# Patient Record
Sex: FEMALE | Race: BLACK OR AFRICAN AMERICAN | Employment: FULL TIME | URBAN - METROPOLITAN AREA
[De-identification: names, ages, dates, MRNs, and addresses within clinical notes are randomized per-mention and may not be internally consistent; named-entity substitution may affect disease eponyms.]

---

## 2019-10-07 ENCOUNTER — OFFICE VISIT (OUTPATIENT)
Dept: FAMILY MEDICINE CLINIC | Age: 44
End: 2019-10-07

## 2019-10-07 VITALS
HEIGHT: 63 IN | TEMPERATURE: 97.9 F | HEART RATE: 83 BPM | RESPIRATION RATE: 16 BRPM | BODY MASS INDEX: 35.44 KG/M2 | WEIGHT: 200 LBS | DIASTOLIC BLOOD PRESSURE: 78 MMHG | SYSTOLIC BLOOD PRESSURE: 114 MMHG | OXYGEN SATURATION: 99 %

## 2019-10-07 DIAGNOSIS — I10 ESSENTIAL HYPERTENSION: Primary | ICD-10-CM

## 2019-10-07 DIAGNOSIS — Z83.3 FAMILY HISTORY OF DIABETES MELLITUS: ICD-10-CM

## 2019-10-07 DIAGNOSIS — A60.00 GENITAL HERPES SIMPLEX, UNSPECIFIED SITE: ICD-10-CM

## 2019-10-07 DIAGNOSIS — Z11.3 ROUTINE SCREENING FOR STI (SEXUALLY TRANSMITTED INFECTION): ICD-10-CM

## 2019-10-07 RX ORDER — HYDROCHLOROTHIAZIDE 25 MG/1
TABLET ORAL
Refills: 5 | COMMUNITY
Start: 2019-10-03 | End: 2019-10-07 | Stop reason: SDUPTHER

## 2019-10-07 RX ORDER — HYDROCHLOROTHIAZIDE 25 MG/1
25 TABLET ORAL DAILY
Qty: 90 TAB | Refills: 1 | Status: SHIPPED | OUTPATIENT
Start: 2019-10-07 | End: 2020-08-28 | Stop reason: SDUPTHER

## 2019-10-07 RX ORDER — VALACYCLOVIR HYDROCHLORIDE 500 MG/1
500 TABLET, FILM COATED ORAL 2 TIMES DAILY
Qty: 10 TAB | Refills: 5 | Status: SHIPPED | OUTPATIENT
Start: 2019-10-07 | End: 2020-08-28 | Stop reason: SDUPTHER

## 2019-10-07 RX ORDER — VALACYCLOVIR HYDROCHLORIDE 500 MG/1
TABLET, FILM COATED ORAL
Refills: 5 | COMMUNITY
Start: 2019-09-04 | End: 2019-10-07 | Stop reason: SDUPTHER

## 2019-10-07 NOTE — PROGRESS NOTES
Identified pt with two pt identifiers(name and ). Reviewed record in preparation for visit and have obtained necessary documentation. Chief Complaint   Patient presents with   [address] [facility] Due   Topic    DTaP/Tdap/Td series (1 - Tdap)    PAP AKA CERVICAL CYTOLOGY     Influenza Age 5 to Adult        Coordination of Care Questionnaire:  :   1) Have you been to an emergency room, urgent care, or hospitalized since your last visit? If yes, where when, and reason for visit? no      2. Have seen or consulted any other health care provider since your last visit? If yes, where when, and reason for visit?  no        Patient is accompanied by self I have received verbal consent from Blas Good to discuss any/all medical information while they are present in the room.

## 2019-10-07 NOTE — PATIENT INSTRUCTIONS
DASH Diet: Care Instructions  Your Care Instructions    The DASH diet is an eating plan that can help lower your blood pressure. DASH stands for Dietary Approaches to Stop Hypertension. Hypertension is high blood pressure. The DASH diet focuses on eating foods that are high in calcium, potassium, and magnesium. These nutrients can lower blood pressure. The foods that are highest in these nutrients are fruits, vegetables, low-fat dairy products, nuts, seeds, and legumes. But taking calcium, potassium, and magnesium supplements instead of eating foods that are high in those nutrients does not have the same effect. The DASH diet also includes whole grains, fish, and poultry. The DASH diet is one of several lifestyle changes your doctor may recommend to lower your high blood pressure. Your doctor may also want you to decrease the amount of sodium in your diet. Lowering sodium while following the DASH diet can lower blood pressure even further than just the DASH diet alone. Follow-up care is a key part of your treatment and safety. Be sure to make and go to all appointments, and call your doctor if you are having problems. It's also a good idea to know your test results and keep a list of the medicines you take. How can you care for yourself at home? Following the DASH diet  · Eat 4 to 5 servings of fruit each day. A serving is 1 medium-sized piece of fruit, ½ cup chopped or canned fruit, 1/4 cup dried fruit, or 4 ounces (½ cup) of fruit juice. Choose fruit more often than fruit juice. · Eat 4 to 5 servings of vegetables each day. A serving is 1 cup of lettuce or raw leafy vegetables, ½ cup of chopped or cooked vegetables, or 4 ounces (½ cup) of vegetable juice. Choose vegetables more often than vegetable juice. · Get 2 to 3 servings of low-fat and fat-free dairy each day. A serving is 8 ounces of milk, 1 cup of yogurt, or 1 ½ ounces of cheese. · Eat 6 to 8 servings of grains each day.  A serving is 1 slice of bread, 1 ounce of dry cereal, or ½ cup of cooked rice, pasta, or cooked cereal. Try to choose whole-grain products as much as possible. · Limit lean meat, poultry, and fish to 2 servings each day. A serving is 3 ounces, about the size of a deck of cards. · Eat 4 to 5 servings of nuts, seeds, and legumes (cooked dried beans, lentils, and split peas) each week. A serving is 1/3 cup of nuts, 2 tablespoons of seeds, or ½ cup of cooked beans or peas. · Limit fats and oils to 2 to 3 servings each day. A serving is 1 teaspoon of vegetable oil or 2 tablespoons of salad dressing. · Limit sweets and added sugars to 5 servings or less a week. A serving is 1 tablespoon jelly or jam, ½ cup sorbet, or 1 cup of lemonade. · Eat less than 2,300 milligrams (mg) of sodium a day. If you limit your sodium to 1,500 mg a day, you can lower your blood pressure even more. Tips for success  · Start small. Do not try to make dramatic changes to your diet all at once. You might feel that you are missing out on your favorite foods and then be more likely to not follow the plan. Make small changes, and stick with them. Once those changes become habit, add a few more changes. · Try some of the following:  ? Make it a goal to eat a fruit or vegetable at every meal and at snacks. This will make it easy to get the recommended amount of fruits and vegetables each day. ? Try yogurt topped with fruit and nuts for a snack or healthy dessert. ? Add lettuce, tomato, cucumber, and onion to sandwiches. ? Combine a ready-made pizza crust with low-fat mozzarella cheese and lots of vegetable toppings. Try using tomatoes, squash, spinach, broccoli, carrots, cauliflower, and onions. ? Have a variety of cut-up vegetables with a low-fat dip as an appetizer instead of chips and dip. ? Sprinkle sunflower seeds or chopped almonds over salads. Or try adding chopped walnuts or almonds to cooked vegetables.   ? Try some vegetarian meals using beans and peas. Add garbanzo or kidney beans to salads. Make burritos and tacos with mashed stokes beans or black beans. Where can you learn more? Go to http://roland-nury.info/. Enter K663 in the search box to learn more about \"DASH Diet: Care Instructions. \"  Current as of: April 9, 2019  Content Version: 12.2  © 3612-5742 [employer]. Care instructions adapted under license by [employer] (which disclaims liability or warranty for this information). If you have questions about a medical condition or this instruction, always ask your healthcare professional. [address] any warranty or liability for your use of this information.

## 2019-10-09 LAB
ALBUMIN SERPL-MCNC: 4.4 G/DL (ref 3.5–5.5)
ALBUMIN/GLOB SERPL: 1.6 {RATIO} (ref 1.2–2.2)
ALP SERPL-CCNC: 39 IU/L (ref 39–117)
ALT SERPL-CCNC: 23 IU/L (ref 0–32)
AST SERPL-CCNC: 16 IU/L (ref 0–40)
BILIRUB SERPL-MCNC: 0.3 MG/DL (ref 0–1.2)
BUN SERPL-MCNC: 10 MG/DL (ref 6–24)
BUN/CREAT SERPL: 11 (ref 9–23)
C TRACH RRNA SPEC QL NAA+PROBE: NEGATIVE
CALCIUM SERPL-MCNC: 9.4 MG/DL (ref 8.7–10.2)
CHLORIDE SERPL-SCNC: 103 MMOL/L (ref 96–106)
CHOLEST SERPL-MCNC: 155 MG/DL (ref 100–199)
CO2 SERPL-SCNC: 19 MMOL/L (ref 20–29)
CREAT SERPL-MCNC: 0.88 MG/DL (ref 0.57–1)
EST. AVERAGE GLUCOSE BLD GHB EST-MCNC: 114 MG/DL
GLOBULIN SER CALC-MCNC: 2.7 G/DL (ref 1.5–4.5)
GLUCOSE SERPL-MCNC: 81 MG/DL (ref 65–99)
HBA1C MFR BLD: 5.6 % (ref 4.8–5.6)
HBV CORE AB SERPL QL IA: NEGATIVE
HDLC SERPL-MCNC: 51 MG/DL
HIV 1+2 AB+HIV1 P24 AG SERPL QL IA: NON REACTIVE
LDLC SERPL CALC-MCNC: 85 MG/DL (ref 0–99)
N GONORRHOEA RRNA SPEC QL NAA+PROBE: NEGATIVE
POTASSIUM SERPL-SCNC: 4.3 MMOL/L (ref 3.5–5.2)
PROT SERPL-MCNC: 7.1 G/DL (ref 6–8.5)
SODIUM SERPL-SCNC: 139 MMOL/L (ref 134–144)
T PALLIDUM AB SER QL IA: NEGATIVE
T VAGINALIS DNA SPEC QL NAA+PROBE: NEGATIVE
TRIGL SERPL-MCNC: 96 MG/DL (ref 0–149)
TSH SERPL DL<=0.005 MIU/L-ACNC: 2.48 UIU/ML (ref 0.45–4.5)
VLDLC SERPL CALC-MCNC: 19 MG/DL (ref 5–40)

## 2019-10-22 ENCOUNTER — DOCUMENTATION ONLY (OUTPATIENT)
Dept: FAMILY MEDICINE CLINIC | Age: 44
End: 2019-10-22

## 2019-10-22 NOTE — PROGRESS NOTES
Authorization Release Health Forms completed and faxed by 10/22/2019 to   [facility] (Mammogram)- (f): [phone]  Dr Chela Estrella (Pap Smears)- (f): [phone]    Confirmation: OK

## 2020-08-28 ENCOUNTER — VIRTUAL VISIT (OUTPATIENT)
Dept: FAMILY MEDICINE CLINIC | Age: 45
End: 2020-08-28

## 2020-08-28 DIAGNOSIS — I10 ESSENTIAL HYPERTENSION: ICD-10-CM

## 2020-08-28 DIAGNOSIS — A60.00 GENITAL HERPES SIMPLEX, UNSPECIFIED SITE: ICD-10-CM

## 2020-08-28 PROCEDURE — 99213 OFFICE O/P EST LOW 20 MIN: CPT | Performed by: FAMILY MEDICINE

## 2020-08-28 RX ORDER — VALACYCLOVIR HYDROCHLORIDE 500 MG/1
500 TABLET, FILM COATED ORAL 2 TIMES DAILY
Qty: 10 TAB | Refills: 5 | Status: SHIPPED | OUTPATIENT
Start: 2020-08-28

## 2020-08-28 RX ORDER — HYDROCHLOROTHIAZIDE 25 MG/1
25 TABLET ORAL DAILY
Qty: 90 TAB | Refills: 1 | Status: SHIPPED | OUTPATIENT
Start: 2020-08-28

## 2020-08-28 NOTE — PROGRESS NOTES
Efren Hinojosa  39 y.o. female  1975  [id number]    [facility]  Progress Note     Encounter Date: 8/28/2020    Efren Hinojosa is a 39 y.o. female who was seen by synchronous (real-time) audio-video technology on 8/28/2020. She and/or her healthcare decision maker is aware that this patient-initiated Telehealth encounter is a billable service, with coverage as determined by her insurance carrier. She  is aware that she may receive a bill and has provided verbal consent to proceed:Yes    I was in the office while conducting this encounter. The patient was checked in/\"roomed\" by Pratima Nelson CMA via telephone in the office. The patient was at home during the encounter. This visit was completed virtually using Doxy. me  Assessment and Plan:     Encounter Diagnoses     ICD-10-CM ICD-9-CM   1. Essential hypertension  I10 401.9   2. Genital herpes simplex, unspecified site  A60.00 054.10       1. Essential hypertension  Well controlled. Continue current medication. Patient to complete BMP. - hydroCHLOROthiazide (HYDRODIURIL) 25 mg tablet; Take 1 Tab by mouth daily. Dispense: 90 Tab; Refill: 1    2. Genital herpes simplex, unspecified site  No current eruption.   - valACYclovir (VALTREX) 500 mg tablet; Take 1 Tab by mouth two (2) times a day. Dispense: 10 Tab; Refill: 5      We discussed the expected course, resolution and complications of the diagnosis(es) in detail. Medication risks, benefits, costs, interactions, and alternatives were discussed as indicated. I advised her to contact the office if her condition worsens, changes or fails to improve as anticipated. She expressed understanding with the diagnosis(es) and plan.      CPT Codes 61180-87997 for Established Patients may apply to this Telehealth Visit    Pursuant to the emergency declaration under the [facility], [address] [place] [address] authority and the [facility] and Response Supplemental Appropriations Act, this Virtual  Visit was conducted, with patient's consent, to reduce the patient's risk of exposure to COVID-19 and provide continuity of care for an established patient. Services were provided through a video synchronous discussion virtually to substitute for in-person clinic visit. Electronically Signed: Nanci Rice MD    Current Medications after this visit     Current Outpatient Medications   Medication Sig    hydroCHLOROthiazide (HYDRODIURIL) 25 mg tablet Take 1 Tab by mouth daily.  valACYclovir (VALTREX) 500 mg tablet Take 1 Tab by mouth two (2) times a day. No current facility-administered medications for this visit. Medications Discontinued During This Encounter   Medication Reason    hydroCHLOROthiazide (HYDRODIURIL) 25 mg tablet Reorder    valACYclovir (VALTREX) 500 mg tablet Reorder     ~~~~~~~~~~~~~~~~~~~~~~~~~~~~~~~~~~~~~~~~~~~~~~    Chief Complaint   Patient presents with    Medication Refill       History of Present Illness   Silas Palafox is a 39 y.o. female who presents for:    Hypertension: Controlled   BP Readings from Last 3 Encounters:   10/07/19 114/78   11/22/10 142/72   11/18/10 (!) 179/109     The patient reports:  taking medications as instructed, no medication side effects noted, no TIA's, no chest pain on exertion, no dyspnea on exertion, no swelling of ankles. Home monitoring:No    Genital herpes  Patient present with cc of genital herpes. Patient reports no current flairs of herpes, She takes medication only as needed. Review of Systems   Review of Systems   Constitutional: Negative for chills and fever. HENT: Negative for congestion, ear discharge and sore throat. Eyes: Negative for double vision, photophobia and discharge. Respiratory: Negative for cough, sputum production, shortness of breath and wheezing. Cardiovascular: Negative for chest pain, palpitations and leg swelling. Gastrointestinal: Negative for abdominal pain, diarrhea, nausea and vomiting. Genitourinary: Negative for dysuria and urgency. Skin: Negative. Neurological: Negative for dizziness, tremors and headaches. Vitals/Objective:     Due to this being a TeleHealth evaluation, many elements of the physical examination are unable to be assessed. Physical Exam  Constitutional:       General: She is not in acute distress. Appearance: Normal appearance. She is obese. She is not ill-appearing, toxic-appearing or diaphoretic. HENT:      Head: Normocephalic and atraumatic. Right Ear: External ear normal.      Left Ear: External ear normal.      Mouth/Throat:      Mouth: Mucous membranes are moist.   Eyes:      General:         Right eye: No discharge. Left eye: No discharge. Extraocular Movements: Extraocular movements intact. Conjunctiva/sclera: Conjunctivae normal.   Neck:      Musculoskeletal: Normal range of motion. Pulmonary:      Effort: Pulmonary effort is normal.      Comments: No visualized signs of difficulty breathing or respiratory distress  Skin:     Coloration: Skin is not pale. Findings: No erythema or rash. Neurological:      Mental Status: She is alert and oriented to person, place, and time. Cranial Nerves: No cranial nerve deficit ( No Facial Asymmetry (Cranial nerve 7 motor function) (limited exam due to video visit) ). Comments: Able to follow commands    Psychiatric:         Mood and Affect: Mood normal.         Behavior: Behavior normal.          No results found for this or any previous visit (from the past 24 hour(s)). Disposition     Follow-up and Dispositions  ·   Return in about 6 months (around 2/28/2021) for Routine (Chronic Conditions). No future appointments. History   Patient's past medical, surgical and family histories were reviewed and updated.     Past Medical History:   Diagnosis Date    Hypertension      Past Surgical History:   Procedure Laterality Date    HX HEENT  1991    Sinus surgery    HX MYOMECTOMY  2011    for fibroids     Family History   Problem Relation Age of Onset    Cancer Father         Lymphoma    COPD Father     Hypertension Father     Diabetes Father     Kidney Disease Father     Diabetes Brother     High Cholesterol Maternal Grandmother     Cancer Maternal Grandfather     Hypertension Paternal Grandmother     Diabetes Paternal Grandfather      Social History     Tobacco Use    Smoking status: Never Smoker    Smokeless tobacco: Never Used   Substance Use Topics    Alcohol use: Yes     Frequency: 2-4 times a month     Drinks per session: 1 or 2     Binge frequency: Never    Drug use: No       Allergies   No Known Allergies

## 2020-08-28 NOTE — PROGRESS NOTES
Identified pt with two pt identifiers(name and ). Reviewed record in preparation for visit and have obtained necessary documentation. Chief Complaint   Patient presents with    Medication Refill        Health Maintenance Due   Topic    DTaP/Tdap/Td series (2 - Td)       Coordination of Care Questionnaire:  :   1) Have you been to an emergency room, urgent care, or hospitalized since your last visit? If yes, where when, and reason for visit? no      2. Have seen or consulted any other health care provider since your last visit? If yes, where when, and reason for visit? no        Patient is accompanied by self I have received verbal consent from Radha Bruno to discuss any/all medical information while they are present in the room.

## 2020-11-07 NOTE — PROGRESS NOTES
Friend has covid.    Zee with her 11/1/20.  Zee doesn't have any symptoms.    Unsure of friends first symptoms.    Friend was tested 11/5/20 - positive.    I suggested Zee find out when her friend developed her first symptoms.    If it turns out Zee was exposed and Zee needs note to return to work/school, Zee should call back.    Zee should also call back if she develops symptoms.  Zee stated understanding of information given.    Reason for Disposition    [1] Living in area with community spread (identified by local PHD) BUT [2] NO symptoms    Additional Information    Negative: COVID-19 has been diagnosed by a healthcare provider (HCP)    Negative: COVID-19 lab test positive    Negative: [1] Symptoms of COVID-19 (e.g., cough, fever, SOB, or others) AND [2] lives in an area with community spread    Negative: [1] Symptoms of COVID-19 (e.g., cough, fever, SOB, or others) AND [2] within 14 days of EXPOSURE (close contact) with diagnosed or suspected COVID-19 patient    Negative: [1] Symptoms of COVID-19 (e.g., cough, fever, SOB, or others) AND [2] within 14 days of travel from high-risk area for COVID-19 community spread (identified by CDC)    Negative: [1] Difficulty breathing (shortness of breath) occurs AND [2] onset > 14 days after COVID-19 EXPOSURE (Close Contact) AND [3] no community spread where patient lives    Negative: [1] Dry cough occurs AND [2] onset > 14 days after COVID-19 EXPOSURE AND [3] no community spread where patient lives    Negative: [1] Wet cough (i.e., white-yellow, yellow, green, or michele colored sputum) AND [2] onset > 14 days after COVID-19 EXPOSURE AND [3] no community spread where patient lives    Negative: [1] Common cold symptoms AND [2] onset > 14 days after COVID-19 EXPOSURE AND [3] no community spread where patient lives    Negative: [1] COVID-19 EXPOSURE (Close Contact) within last 14 days AND [2] needs COVID-19 lab test to return to work AND [3] NO  Kaelyn Cannon  40 y.o. female  1975  [id number]    [facility] MEDICINE  Progress Note     Encounter Date: 10/7/2019    Assessment and Plan:     Encounter Diagnoses     ICD-10-CM ICD-9-CM   1. Essential hypertension I10 401.9   2. Family history of diabetes mellitus Z83.3 V18.0   3. Genital herpes simplex, unspecified site A60.00 054.10   4. Routine screening for STI (sexually transmitted infection) Z11.3 V74.5       1. Essential hypertension  Well controlled. Continue HCTZ. 2  - TSH 3RD GENERATION  - METABOLIC PANEL, COMPREHENSIVE  - hydroCHLOROthiazide (HYDRODIURIL) 25 mg tablet; Take 1 Tab by mouth daily. Dispense: 90 Tab; Refill: 1    2. Family history of diabetes mellitus  Screening.   - LIPID PANEL  - METABOLIC PANEL, COMPREHENSIVE    3. Genital herpes simplex, unspecified site  Continue PRN treatment. Patient denies outbreaks in the past year. - valACYclovir (VALTREX) 500 mg tablet; Take 1 Tab by mouth two (2) times a day. Dispense: 10 Tab; Refill: 5    4. Routine screening for STI (sexually transmitted infection)  - CT/NG/T.VAGINALIS AMPLIFICATION  - HEPATITIS B CORE AB W/REFLEX  - HIV 1/2 AG/AB, 4TH GENERATION,W RFLX CONFIRM  - T PALLIDUM SCREEN W/REFLEX  - HEMOGLOBIN A1C WITH EAG      I have discussed the diagnosis with the patient and the intended plan as seen in the above orders. she has expressed understanding. The patient has received an after-visit summary and questions were answered concerning future plans. I have discussed medication side effects and warnings with the patient as well. Electronically Signed: More Avalos MD    Current Medications after this visit     Current Outpatient Medications   Medication Sig    valACYclovir (VALTREX) 500 mg tablet Take 1 Tab by mouth two (2) times a day.  hydroCHLOROthiazide (HYDRODIURIL) 25 mg tablet Take 1 Tab by mouth daily. No current facility-administered medications for this visit.       Medications Discontinued During This Encounter   Medication Reason    oxycodone-acetaminophen (PERCOCET 10)  mg per tablet Not A Current Medication    ferrous sulfate (IRON, FERROUS SULFATE,) 325 mg (65 mg Iron) tablet Not A Current Medication    norgestimate-ethinyl estradiol (ORTHO-CYCLEN) 0.25-35 mg-mcg per tablet Not A Current Medication    valACYclovir (VALTREX) 500 mg tablet Reorder    hydroCHLOROthiazide (HYDRODIURIL) 25 mg tablet Reorder     ~~~~~~~~~~~~~~~~~~~~~~~~~~~~~~~~~~~~~~~~~~~~~~    Chief Complaint   Patient presents with    Establish Care       History provided by patient  History of Present Illness   Bárbara Dinh is a 40 y.o. female who presents to clinic today for:      [address] [facility] patient who presents to establish PCP care. I personally reviewed and updated the patient's medical, surgical, family and social history. PREVIOUS PRIMARY CARE PROVIDER and SPECIALISTS  [facility]. Patient decided to come to [place] due to PCP left [facility]. Mini Singh RECORDS  Provided by patient: none. SPECIALISTS  Patient Care Team:  Maritza Renee MD as PCP - General (Family Practice)  Marchelle Soulier, MD (Obstetrics & Gynecology)    Establish Care. Patient present with cc of \[facility] [place] [facility]\"" care. Patient is looking to get blood work. She is currently on HCTZ for hypertension. She has managed to get off of the BP medication in the past but due to stresses had gotten back on, She has changed her diet and is going back to working out. She was also told that she was pre-diabetes. Health Maintenance  Completed by outside provider. Release for records signed.,  recommendation discussed and ordered with patient's permission. Health Maintenance Due   Topic Date Due    DTaP/Tdap/Td series (1 - Tdap) 07/03/1996    PAP AKA CERVICAL CYTOLOGY  07/03/1996     Review of Systems   Review of Systems   Constitutional: Positive for weight loss. Negative for chills and fever. symptoms     unsure    Negative: [1] COVID-19 EXPOSURE (Close Contact) within last 14 days AND [2] exposed person is a healthcare worker who was NOT using all recommended personal protective equipment (i.e., a respirator-N95 mask, eye protection, gloves, and gown) AND [3] NO symptoms    Negative: [1] COVID-19 EXPOSURE AND [2] 15 or more days ago AND [3] NO symptoms    Commented on: [1] COVID-19 EXPOSURE (Close Contact) AND [2] within last 14 days BUT [3] NO symptoms     unsure    Protocols used: CORONAVIRUS (COVID-19) EXPOSURE-A- 8.4.20    Hannah BUSH RN [facility]      Cardiovascular: Negative for chest pain, palpitations and leg swelling. Gastrointestinal: Negative for abdominal pain, constipation, diarrhea, nausea and vomiting. Genitourinary: Negative for dysuria and urgency. Skin: Negative for itching and rash. Neurological: Negative for dizziness and headaches. Psychiatric/Behavioral: Negative for depression and suicidal ideas. Vitals/Objective:     Vitals:    10/07/19 1028   BP: 114/78   Pulse: 83   Resp: 16   Temp: 97.9 °F (36.6 °C)   TempSrc: Oral   SpO2: 99%   Weight: 200 lb (90.7 kg)   Height: 5' 3\" (1.6 m)     Body mass index is 35.43 kg/m². Wt Readings from Last 3 Encounters:   10/07/19 200 lb (90.7 kg)   11/19/10 187 lb (84.8 kg)   11/18/10 187 lb 6.3 oz (85 kg)       Physical Exam   Constitutional: She is oriented to person, place, and time. She appears well-developed and well-nourished. No distress. HENT:   Head: Normocephalic and atraumatic. Right Ear: External ear normal.   Left Ear: External ear normal.   Mouth/Throat: Oropharynx is clear and moist. No oropharyngeal exudate. Cardiovascular: Normal rate, S1 normal and S2 normal.   No murmur heard. Pulmonary/Chest: Effort normal and breath sounds normal. She has no rales. Musculoskeletal: She exhibits no edema. Neurological: She is alert and oriented to person, place, and time. Skin: Skin is warm and dry. No results found for this or any previous visit (from the past 24 hour(s)). Disposition     Follow-up and Dispositions  ·   Return in about 6 months (around 4/7/2020) for Routine (Chronic Conditions). No future appointments. History   Patient's past medical, surgical and family histories were reviewed and updated.     Past Medical History:   Diagnosis Date    Hypertension      Past Surgical History:   Procedure Laterality Date    HX HEENT  1991    Sinus surgery    HX MYOMECTOMY  2011    for fibroids     Family History   Problem Relation Age of Onset    Cancer Father         Lymphoma    COPD Father     Hypertension Father     Diabetes Father     Kidney Disease Father     Diabetes Brother     High Cholesterol Maternal Grandmother     Cancer Maternal Grandfather     Hypertension Paternal Grandmother     Diabetes Paternal Grandfather      Social History     Tobacco Use    Smoking status: Never Smoker    Smokeless tobacco: Never Used   Substance Use Topics    Alcohol use: Yes     Frequency: 2-4 times a month     Drinks per session: 1 or 2     Binge frequency: Never    Drug use: No       Allergies   No Known Allergies